# Patient Record
Sex: FEMALE | Race: WHITE
[De-identification: names, ages, dates, MRNs, and addresses within clinical notes are randomized per-mention and may not be internally consistent; named-entity substitution may affect disease eponyms.]

---

## 2020-06-01 ENCOUNTER — HOSPITAL ENCOUNTER (EMERGENCY)
Dept: HOSPITAL 96 - M.ERS | Age: 65
Discharge: HOME | End: 2020-06-01
Payer: COMMERCIAL

## 2020-06-01 VITALS — WEIGHT: 152.01 LBS | HEIGHT: 61 IN | BODY MASS INDEX: 28.7 KG/M2

## 2020-06-01 VITALS — DIASTOLIC BLOOD PRESSURE: 59 MMHG | SYSTOLIC BLOOD PRESSURE: 120 MMHG

## 2020-06-01 DIAGNOSIS — N39.0: Primary | ICD-10-CM

## 2020-06-01 DIAGNOSIS — F17.210: ICD-10-CM

## 2020-06-01 DIAGNOSIS — I10: ICD-10-CM

## 2020-06-01 DIAGNOSIS — R53.1: ICD-10-CM

## 2020-06-01 LAB
ABSOLUTE MONOCYTES: 0.1 THOU/UL (ref 0–1.2)
ALBUMIN SERPL-MCNC: 3.2 G/DL (ref 3.4–5)
ALP SERPL-CCNC: 101 U/L (ref 46–116)
ALT SERPL-CCNC: 14 U/L (ref 30–65)
ANION GAP SERPL CALC-SCNC: 8 MMOL/L (ref 7–16)
APTT BLD: 36.4 SECONDS (ref 25–31.3)
AST SERPL-CCNC: 12 U/L (ref 15–37)
BACTERIA-REFLEX: (no result) /HPF
BILIRUB SERPL-MCNC: 0.3 MG/DL
BILIRUB UR-MCNC: NEGATIVE MG/DL
BUN SERPL-MCNC: 8 MG/DL (ref 7–18)
CALCIUM SERPL-MCNC: 8.2 MG/DL (ref 8.5–10.1)
CHLORIDE SERPL-SCNC: 94 MMOL/L (ref 98–107)
CO2 SERPL-SCNC: 27 MMOL/L (ref 21–32)
COLOR UR: YELLOW
CREAT SERPL-MCNC: 1.2 MG/DL (ref 0.6–1.3)
GLUCOSE SERPL-MCNC: 94 MG/DL (ref 70–99)
GRANULOCYTES NFR BLD MANUAL: 66 %
HCT VFR BLD CALC: 31.5 % (ref 37–47)
HGB BLD-MCNC: 10.7 GM/DL (ref 12–15)
HYALINE CASTS #/AREA URNS LPF: (no result) /LPF
INR PPP: 1.2
KETONES UR STRIP-MCNC: NEGATIVE MG/DL
LYMPHOCYTES # BLD: 0.9 THOU/UL (ref 0.8–5.3)
LYMPHOCYTES NFR BLD AUTO: 9 %
MCH RBC QN AUTO: 30.7 PG (ref 26–34)
MCHC RBC AUTO-ENTMCNC: 34.2 G/DL (ref 28–37)
MCV RBC: 89.8 FL (ref 80–100)
METAMYELOCYTES NFR BLD: 1 %
MONOCYTES NFR BLD: 1 %
MPV: 7.8 FL. (ref 7.2–11.1)
MUCUS: (no result) STRN/LPF
NEUTROPHILS # BLD: 8.7 THOU/UL (ref 1.6–8.1)
NEUTS BAND NFR BLD: 23 %
NT-PRO BRAIN NAT PEPTIDE: 1075 PG/ML (ref ?–300)
NUCLEATED RBCS: 0 /100WBC
PLATELET # BLD EST: ADEQUATE 10*3/UL
PLATELET COUNT*: 262 THOU/UL (ref 150–400)
POTASSIUM SERPL-SCNC: 3.1 MMOL/L (ref 3.5–5.1)
PROT SERPL-MCNC: 7.4 G/DL (ref 6.4–8.2)
PROT UR QL STRIP: NEGATIVE
PROTHROMBIN TIME: 11.8 SECONDS (ref 9.2–11.5)
RBC # BLD AUTO: 3.5 MIL/UL (ref 4.2–5)
RBC # UR STRIP: (no result) /UL
RBC #/AREA URNS HPF: (no result) /HPF (ref 0–2)
RBC MORPH BLD: NORMAL
RDW-CV: 12.9 % (ref 10.5–14.5)
SODIUM SERPL-SCNC: 129 MMOL/L (ref 136–145)
SP GR UR STRIP: 1.02 (ref 1–1.03)
SQUAMOUS: (no result) /LPF (ref 0–3)
URINE CLARITY: CLEAR
URINE GLUCOSE-RANDOM: NEGATIVE
URINE LEUKOCYTES-REFLEX: (no result)
URINE NITRITE-REFLEX: NEGATIVE
URINE WBC-REFLEX: (no result) /HPF (ref 0–5)
UROBILINOGEN UR STRIP-ACNC: 0.2 E.U./DL (ref 0.2–1)
WBC # BLD AUTO: 9.7 THOU/UL (ref 4–11)

## 2020-06-02 NOTE — EKG
[place] [address]
Phone:  [phone]                     ELECTROCARDIOGRAM REPORT      
_______________________________________________________________________________
 
Name:         ALISIAZO PRABHAKAR                Room:                     [facility]#:    [id number]     Account #:     [id number]  
Admission:    20    Attend Phys:                     
Discharge:    20    Date of Birth: [birth year]  
Date of Service: 20 1651  Report #:      [id number]
        [id number]
_______________________________________________________________________________
THIS REPORT FOR:  //name//                      
 
                         [facility] ED
                                       
Test Date:    2020               Test Time:    16:51:39
Pat Name:     ZO CRUMP             Department:   
Patient ID:   [id number]            Room:          
Gender:       F                        Technician:   CCD
:          1955               Requested By: Matteo Casiano
Order Number: [id number] MD:   Evan Tsai
                                 Measurements
Intervals                              Axis          
Rate:         99                       P:            43
ID:           [id number]                      QRS:          12
QRSD:         104                      T:            59
QT:           342                                    
QTc:          439                                    
                           Interpretive Statements
Sinus rhythm
Minimal ST depression, anterolateral leads
No previous ECG available for comparison
 
Electronically Signed On 2020 13:27:27 CDT by Evan Tsai
[email or web address]
 
 
 
 
 
 
 
 
 
 
 
 
 
 
 
 
 
 
 
 
  <ELECTRONICALLY SIGNED>
                                           By: Evan Tsai MD, [facility]      
  20     1327
D: 20   _____________________________________
T: 20   Evan Tsai MD, [facility]        /EPI

## 2020-09-29 ENCOUNTER — HOSPITAL ENCOUNTER (OUTPATIENT)
Dept: HOSPITAL 96 - M.LAB | Age: 65
End: 2020-09-29
Attending: INTERNAL MEDICINE
Payer: MEDICARE

## 2020-09-29 DIAGNOSIS — Z51.81: Primary | ICD-10-CM

## 2020-09-29 DIAGNOSIS — Z79.899: ICD-10-CM

## 2020-09-29 LAB
ABSOLUTE BASOPHILS: 0.1 THOU/UL (ref 0–0.2)
ABSOLUTE EOSINOPHILS: 0.1 THOU/UL (ref 0–0.7)
ABSOLUTE MONOCYTES: 0.2 THOU/UL (ref 0–1.2)
ALBUMIN SERPL-MCNC: 4.2 G/DL (ref 3.4–5)
ALP SERPL-CCNC: 103 U/L (ref 46–116)
ALT SERPL-CCNC: 17 U/L (ref 30–65)
ANION GAP SERPL CALC-SCNC: 9 MMOL/L (ref 7–16)
AST SERPL-CCNC: 16 U/L (ref 15–37)
BASOPHILS NFR BLD AUTO: 1.4 %
BILIRUB SERPL-MCNC: 0.3 MG/DL
BUN SERPL-MCNC: 7 MG/DL (ref 7–18)
CALCIUM SERPL-MCNC: 9 MG/DL (ref 8.5–10.1)
CHLORIDE SERPL-SCNC: 102 MMOL/L (ref 98–107)
CHOLEST SERPL-MCNC: 225 MG/DL (ref ?–200)
CO2 SERPL-SCNC: 29 MMOL/L (ref 21–32)
CREAT SERPL-MCNC: 1 MG/DL (ref 0.6–1.3)
EOSINOPHIL NFR BLD: 1.6 %
GLUCOSE SERPL-MCNC: 69 MG/DL (ref 70–99)
GRANULOCYTES NFR BLD MANUAL: 40.6 %
HCT VFR BLD CALC: 37.9 % (ref 37–47)
HDLC SERPL-MCNC: 71 MG/DL (ref 40–?)
HGB BLD-MCNC: 12.6 GM/DL (ref 12–15)
LDLC SERPL-MCNC: 120 MG/DL (ref ?–100)
LYMPHOCYTES # BLD: 2.4 THOU/UL (ref 0.8–5.3)
LYMPHOCYTES NFR BLD AUTO: 51.5 %
MCH RBC QN AUTO: 30.5 PG (ref 26–34)
MCHC RBC AUTO-ENTMCNC: 33.3 G/DL (ref 28–37)
MCV RBC: 91.5 FL (ref 80–100)
MONOCYTES NFR BLD: 4.9 %
MPV: 8 FL. (ref 7.2–11.1)
NEUTROPHILS # BLD: 1.9 THOU/UL (ref 1.6–8.1)
NUCLEATED RBCS: 0 /100WBC
PLATELET COUNT*: 181 THOU/UL (ref 150–400)
POTASSIUM SERPL-SCNC: 3.7 MMOL/L (ref 3.5–5.1)
PROT SERPL-MCNC: 7.7 G/DL (ref 6.4–8.2)
RBC # BLD AUTO: 4.14 MIL/UL (ref 4.2–5)
RDW-CV: 12.8 % (ref 10.5–14.5)
SODIUM SERPL-SCNC: 140 MMOL/L (ref 136–145)
TC:HDL: 3.2 RATIO
TRIGL SERPL-MCNC: 172 MG/DL (ref ?–150)
VLDLC SERPL CALC-MCNC: 34 MG/DL (ref ?–40)
WBC # BLD AUTO: 4.6 THOU/UL (ref 4–11)

## 2021-04-05 ENCOUNTER — HOSPITAL ENCOUNTER (OUTPATIENT)
Dept: HOSPITAL 96 - M.RAD | Age: 66
End: 2021-04-05
Attending: ORTHOPAEDIC SURGERY
Payer: MEDICARE

## 2021-04-05 DIAGNOSIS — M25.561: Primary | ICD-10-CM

## 2021-04-05 DIAGNOSIS — G89.29: ICD-10-CM
